# Patient Record
Sex: MALE | Race: WHITE | NOT HISPANIC OR LATINO | Employment: UNEMPLOYED | URBAN - METROPOLITAN AREA
[De-identification: names, ages, dates, MRNs, and addresses within clinical notes are randomized per-mention and may not be internally consistent; named-entity substitution may affect disease eponyms.]

---
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
## 2020-10-14 ENCOUNTER — TELEPHONE (OUTPATIENT)
Dept: FAMILY MEDICINE CLINIC | Facility: MEDICAL CENTER | Age: 20
End: 2020-10-14

## 2020-10-14 ENCOUNTER — NURSE TRIAGE (OUTPATIENT)
Dept: OTHER | Facility: OTHER | Age: 20
End: 2020-10-14

## 2020-11-16 ENCOUNTER — OFFICE VISIT (OUTPATIENT)
Dept: URGENT CARE | Facility: MEDICAL CENTER | Age: 20
End: 2020-11-16
Payer: COMMERCIAL

## 2020-11-16 VITALS
TEMPERATURE: 98.2 F | SYSTOLIC BLOOD PRESSURE: 140 MMHG | OXYGEN SATURATION: 99 % | HEART RATE: 60 BPM | RESPIRATION RATE: 20 BRPM | DIASTOLIC BLOOD PRESSURE: 75 MMHG

## 2020-11-16 DIAGNOSIS — J02.9 SORE THROAT: Primary | ICD-10-CM

## 2020-11-16 LAB — S PYO AG THROAT QL: NEGATIVE

## 2020-11-16 PROCEDURE — 87880 STREP A ASSAY W/OPTIC: CPT | Performed by: PHYSICIAN ASSISTANT

## 2020-11-16 PROCEDURE — 99213 OFFICE O/P EST LOW 20 MIN: CPT | Performed by: PHYSICIAN ASSISTANT

## 2020-11-16 PROCEDURE — 87070 CULTURE OTHR SPECIMN AEROBIC: CPT | Performed by: PHYSICIAN ASSISTANT

## 2020-11-16 RX ORDER — LORATADINE 10 MG/1
10 TABLET ORAL DAILY
Qty: 30 TABLET | Refills: 0 | Status: SHIPPED | OUTPATIENT
Start: 2020-11-16

## 2020-11-18 LAB — BACTERIA THROAT CULT: NORMAL

## 2020-12-06 ENCOUNTER — OFFICE VISIT (OUTPATIENT)
Dept: URGENT CARE | Facility: MEDICAL CENTER | Age: 20
End: 2020-12-06
Payer: COMMERCIAL

## 2020-12-06 VITALS
WEIGHT: 145 LBS | HEIGHT: 66 IN | BODY MASS INDEX: 23.3 KG/M2 | HEART RATE: 94 BPM | RESPIRATION RATE: 18 BRPM | OXYGEN SATURATION: 97 % | TEMPERATURE: 98.2 F

## 2020-12-06 DIAGNOSIS — J02.9 SORE THROAT: Primary | ICD-10-CM

## 2020-12-06 LAB — S PYO AG THROAT QL: NEGATIVE

## 2020-12-06 PROCEDURE — 99213 OFFICE O/P EST LOW 20 MIN: CPT | Performed by: PHYSICIAN ASSISTANT

## 2020-12-06 PROCEDURE — 87880 STREP A ASSAY W/OPTIC: CPT | Performed by: PHYSICIAN ASSISTANT

## 2020-12-06 PROCEDURE — 87070 CULTURE OTHR SPECIMN AEROBIC: CPT | Performed by: PHYSICIAN ASSISTANT

## 2020-12-08 LAB — BACTERIA THROAT CULT: NORMAL

## 2021-08-05 ENCOUNTER — OFFICE VISIT (OUTPATIENT)
Dept: URGENT CARE | Facility: MEDICAL CENTER | Age: 21
End: 2021-08-05
Payer: COMMERCIAL

## 2021-08-05 VITALS
WEIGHT: 185 LBS | HEIGHT: 66 IN | HEART RATE: 77 BPM | OXYGEN SATURATION: 97 % | TEMPERATURE: 98.8 F | BODY MASS INDEX: 29.73 KG/M2 | RESPIRATION RATE: 18 BRPM

## 2021-08-05 DIAGNOSIS — B34.9 ACUTE VIRAL SYNDROME: Primary | ICD-10-CM

## 2021-08-05 PROCEDURE — U0005 INFEC AGEN DETEC AMPLI PROBE: HCPCS | Performed by: PHYSICIAN ASSISTANT

## 2021-08-05 PROCEDURE — U0003 INFECTIOUS AGENT DETECTION BY NUCLEIC ACID (DNA OR RNA); SEVERE ACUTE RESPIRATORY SYNDROME CORONAVIRUS 2 (SARS-COV-2) (CORONAVIRUS DISEASE [COVID-19]), AMPLIFIED PROBE TECHNIQUE, MAKING USE OF HIGH THROUGHPUT TECHNOLOGIES AS DESCRIBED BY CMS-2020-01-R: HCPCS | Performed by: PHYSICIAN ASSISTANT

## 2021-08-05 PROCEDURE — 99213 OFFICE O/P EST LOW 20 MIN: CPT | Performed by: PHYSICIAN ASSISTANT

## 2021-08-05 NOTE — PROGRESS NOTES
[facility]        NAME: Betty Hyatt is a 23 y o  male  : 2000    MRN: [id number]  DATE: 2021  TIME: 11:39 AM    Assessment and Plan   Acute viral syndrome [B34 9]  1  Acute viral syndrome  Novel Coronavirus (Covid-19),PCR [facility] - Office Collection         Patient Instructions     1  Increase fluids  2  Tylenol as needed if febrile  3  Strict quarantine until test results available  4  Follow up with PCP in 3-5 days if symptoms persist       Chief Complaint     Chief Complaint   Patient presents with    Fever     Started on Monday with fever 103 8, has been taking tylenol and ibuprofen  FEver is broke and needs to return to work  History of Present Illness        Jadon Diaz is a 11-year-old male that presents with a 3 day history of acute onset fever and headache  Patient denies any chills, body aches, nasal symptoms or cough  He denies any alteration in his sense of smell /taste or nausea/ vomiting since the beginning of symptoms  Patient reports his fever height was up to 103 but improved with antipyretics  He is not vaccinated for COVID-19  Review of Systems   Review of Systems   Constitutional: Positive for fever  HENT: Negative  Respiratory: Negative  Gastrointestinal: Negative  Neurological: Positive for headaches  Current Medications       Current Outpatient Medications:     loratadine (CLARITIN) 10 mg tablet, Take 1 tablet (10 mg total) by mouth daily (Patient not taking: Reported on 2021), Disp: 30 tablet, Rfl: 0    Current Allergies     Allergies as of 2021    (No Known Allergies)            The following portions of the patient's history were reviewed and updated as appropriate: allergies, current medications, past family history, past medical history, past social history, past surgical history and problem list      History reviewed  No pertinent past medical history  History reviewed   No pertinent surgical history  Family History   Problem Relation Age of Onset    No Known Problems Mother     No Known Problems Father          Medications have been verified  Objective   Pulse 77   Temp 98 8 °F (37 1 °C) (Temporal)   Resp 18   Ht 5' 6" (1 676 m)   Wt 83 9 kg (185 lb)   SpO2 97%   BMI 29 86 kg/m²   No LMP for male patient  Physical Exam     Physical Exam  Constitutional:       General: He is not in acute distress  Appearance: Normal appearance  HENT:      Head: Normocephalic and atraumatic  Right Ear: Tympanic membrane and ear canal normal       Left Ear: Tympanic membrane and ear canal normal       Nose: Nose normal       Mouth/Throat:      Lips: Pink  Pharynx: Oropharynx is clear  Cardiovascular:      Rate and Rhythm: Normal rate and regular rhythm  Heart sounds: Normal heart sounds  No murmur heard  Pulmonary:      Effort: Pulmonary effort is normal       Breath sounds: Normal breath sounds  Neurological:      Mental Status: He is alert

## 2021-08-05 NOTE — LETTER
August 5, 2021     Patient: Jesús Gomes   YOB: 2000   Date of Visit: 8/5/2021       To Whom It May Concern: It is my medical opinion that Fortunato Hamilton may return to work on 8/7/21  If you have any questions or concerns, please don't hesitate to call           Sincerely,        Bridget Licona PA-C    CC: No Recipients

## 2021-08-06 LAB — SARS-COV-2 RNA RESP QL NAA+PROBE: NEGATIVE

## 2022-08-07 ENCOUNTER — APPOINTMENT (OUTPATIENT)
Dept: RADIOLOGY | Facility: MEDICAL CENTER | Age: 22
End: 2022-08-07
Attending: PHYSICIAN ASSISTANT
Payer: COMMERCIAL

## 2022-08-07 ENCOUNTER — OFFICE VISIT (OUTPATIENT)
Dept: URGENT CARE | Facility: MEDICAL CENTER | Age: 22
End: 2022-08-07
Payer: COMMERCIAL

## 2022-08-07 VITALS
OXYGEN SATURATION: 98 % | DIASTOLIC BLOOD PRESSURE: 74 MMHG | HEART RATE: 86 BPM | WEIGHT: 192.38 LBS | TEMPERATURE: 97.9 F | BODY MASS INDEX: 31.05 KG/M2 | RESPIRATION RATE: 14 BRPM | SYSTOLIC BLOOD PRESSURE: 120 MMHG

## 2022-08-07 DIAGNOSIS — M25.531 RIGHT WRIST PAIN: ICD-10-CM

## 2022-08-07 DIAGNOSIS — M25.531 RIGHT WRIST PAIN: Primary | ICD-10-CM

## 2022-08-07 PROCEDURE — 29125 APPL SHORT ARM SPLINT STATIC: CPT | Performed by: PHYSICIAN ASSISTANT

## 2022-08-07 PROCEDURE — 73110 X-RAY EXAM OF WRIST: CPT

## 2022-08-07 PROCEDURE — 99213 OFFICE O/P EST LOW 20 MIN: CPT | Performed by: PHYSICIAN ASSISTANT

## 2022-08-07 NOTE — LETTER
August 7, 2022     Patient: Devin Busby   YOB: 2000   Date of Visit: 8/7/2022       To Whom It May Concern: It is my medical opinion that Maxim Her may return to work on 8/9/22  If you have any questions or concerns, please don't hesitate to call  Sincerely,        Melly Barclay PA-C    CC: [employer]

## 2022-08-07 NOTE — PROGRESS NOTES
[facility]        NAME: Elmer Velez is a 24 y o  male  : 2000    MRN: [id number]  DATE: 2022  TIME: 7:19 PM    Assessment and Plan   Right wrist pain [M25 531]  1  Right wrist pain  XR wrist 3+ vw right    Splint application         Patient Instructions     1  Apply ICE to the area 10-15min 3-4x daily  2  Motrin as needed for pain/swelling  3  Continue in wrist splint until pain free  4  Recommend consult with Orthopedics if symptoms persist        Chief Complaint     Chief Complaint   Patient presents with    Wrist Pain     Right wrist pain injured today sliding into 2nd base and feels like wrist is jammed  Limited ROM, very stiff  Mild swelling noted  History of Present Illness       Jonathan Camacho is a 58-year-old male who presents with pain and swelling of his right wrist after an accident that occurred earlier today  Patient was playing softball and while attempting to slight into 2nd base had his right wrist hyperextended  He denies hearing or feeling a snap pop but had immediate pain and swelling to the region  Patient currently rates his pain as 4 on a scale of 1-10, he denies any prior wrist or hand injuries  Wrist Pain   Pertinent negatives include no numbness  Review of Systems   Review of Systems   Constitutional: Negative  Musculoskeletal: Positive for joint swelling  Neurological: Negative for weakness and numbness           Current Medications       Current Outpatient Medications:     loratadine (CLARITIN) 10 mg tablet, Take 1 tablet (10 mg total) by mouth daily (Patient not taking: No sig reported), Disp: 30 tablet, Rfl: 0    Current Allergies     Allergies as of 2022    (No Known Allergies)            The following portions of the patient's history were reviewed and updated as appropriate: allergies, current medications, past family history, past medical history, past social history, past surgical history and problem list      History reviewed  No pertinent past medical history  No past surgical history on file  Family History   Problem Relation Age of Onset    No Known Problems Mother     No Known Problems Father          Medications have been verified  Objective   /74   Pulse 86   Temp 97 9 °F (36 6 °C)   Resp 14   Wt 87 3 kg (192 lb 6 oz)   SpO2 98%   BMI 31 05 kg/m²   No LMP for male patient  Physical Exam     Physical Exam  Constitutional:       General: He is not in acute distress  Appearance: Normal appearance  He is not ill-appearing  Cardiovascular:      Rate and Rhythm: Normal rate and regular rhythm  Heart sounds: Normal heart sounds  No murmur heard  Pulmonary:      Effort: Pulmonary effort is normal       Breath sounds: Normal breath sounds  Musculoskeletal:      Right wrist: Swelling, tenderness, bony tenderness and snuff box tenderness present  No deformity  Decreased range of motion  Comments: Tenderness to palpation over the right distal radius and scaphoid region  Pain level increased with resisted forearm pronation/supination  Neurological:      Mental Status: He is alert  Discuss x-ray with patient, no visible evidence of fracture dislocation  Final results to be reviewed by radiology        Splint application    Date/Time: 8/7/2022 6:59 PM  Performed by: Alejandro Rico PA-C  Authorized by: Alejandro Rico PA-C   Universal Protocol:  Consent given by: patient  Patient understanding: patient states understanding of the procedure being performed  Patient consent: the patient's understanding of the procedure matches consent given      Procedure details:     Location:  Wrist    Splint type:  Short arm splint, static (forearm to hand)

## 2022-08-07 NOTE — PATIENT INSTRUCTIONS
1  Apply ICE to the area 10-15min 3-4x daily  2  Motrin as needed for pain/swelling  3  Continue in wrist splint until pain free  4   Recommend consult with Orthopedics if symptoms persist

## 2022-08-11 ENCOUNTER — TELEPHONE (OUTPATIENT)
Dept: FAMILY MEDICINE CLINIC | Facility: MEDICAL CENTER | Age: 22
End: 2022-08-11

## 2022-08-11 NOTE — TELEPHONE ENCOUNTER
[facility] called to request referral for pt's appt on 8/13/22   [id number], dx M25 [id number], Office, NPI [id number], location [address] will retrieve from pt's chart

## 2022-08-13 ENCOUNTER — HOSPITAL ENCOUNTER (OUTPATIENT)
Dept: RADIOLOGY | Facility: HOSPITAL | Age: 22
Discharge: HOME | End: 2022-08-13
Payer: COMMERCIAL

## 2022-08-13 VITALS — OXYGEN SATURATION: 98 % | BODY MASS INDEX: 31.21 KG/M2 | WEIGHT: 194.2 LBS | HEART RATE: 78 BPM | HEIGHT: 66 IN

## 2022-08-13 DIAGNOSIS — M25.531 PAIN IN RIGHT WRIST: ICD-10-CM

## 2022-08-13 DIAGNOSIS — M25.531 PAIN IN RIGHT WRIST: Primary | ICD-10-CM

## 2022-08-13 DIAGNOSIS — S63.501A RIGHT WRIST SPRAIN, INITIAL ENCOUNTER: ICD-10-CM

## 2022-08-13 PROCEDURE — 73110 X-RAY EXAM OF WRIST: CPT

## 2022-08-13 PROCEDURE — 99203 OFFICE O/P NEW LOW 30 MIN: CPT | Performed by: PHYSICIAN ASSISTANT

## 2022-08-13 NOTE — PROGRESS NOTES
Orthopaedic Surgery - Office Note  June Gray (86 y o  male)   : 2000   MRN: [id number]  Encounter Date: 2022    Chief Complaint   Patient presents with    Right Wrist - Pain         Assessment/Plan  Diagnoses and all orders for this visit:    Pain in right wrist  -     Ambulatory Referral to Occupational Therapy; Future  -     XR wrist 3+ vw right; Future    Right wrist sprain, initial encounter  -     Ambulatory Referral to Occupational Therapy; Future  -     XR wrist 3+ vw right; Future    Patient was advised his x-rays are negative for scaphoid fracture  He will be treated for wrist sprain of the extensor tendons and be started occupational therapy  The brace will be for comfort only and he may progress out of it over the next several weeks  He will ice the wrist for comfort 20 minutes on 1 hour off 3 times a day  No surgery is recommended for this condition  Return in 4 weeks with sports medicine  History of Present Illness  This is a new patient who injured his right wrist on 2022 while sliding into 2nd base  He describes a hyper extension injury to the wrist   This occurred while playing softball  He was [facility] at the [facility] and placed in a splint  No paresthesias are reported  He had x-rays taken which were available for review and are negative for x-ray  He is right-hand dominant and has not had problems with this wrist in the past     Review of Systems  Pertinent items are noted in HPI  All other systems were reviewed and are negative  Physical Exam  Pulse 78   Ht 5' 6" (1 676 m)   Wt 88 1 kg (194 lb 3 2 oz)   SpO2 98%   BMI 31 34 kg/m²   Cons: Appears well  No apparent distress  Psych: Alert  Oriented x3  Mood and affect normal   Eyes: PERRLA, EOMI  Resp: Normal effort  No audible wheezing or stridor  CV: Palpable pulse  No discernable arrhythmia  Lymph:  No palpable cervical, axillary, or inguinal lymphadenopathy  Skin: Warm    No palpable masses  No visible lesions  Neuro: Normal muscle tone  Normal and symmetric DTR's  Patient's right wrist is nontender to palpation at the distal radius and ulna  There is no skin breakdown lesions or signs of infection  He has no crepitus or pain at the TFCC  He has mild anatomical snuffbox tenderness and moderate tenderness in the 1st dorsal compartment  He has pain against wrist extension  Strength is decreased to 4/5  He has full active and passive range of motion at the right wrist to extension flexion, ulnar deviation, radial deviation, supination, and pronation  He has pain to end range of motion to wrist extension  He is neurovascularly intact with normal radial and ulnar pulses  He has full range of motion at all MCP and IP joints of the digits in the right hand  X-rays performed in the office today three views of the right wrist show no evidence of a scaphoid fracture  These read from an orthopedic standpoint will await official radiologist interpretation  Studies Reviewed  Study Result    Narrative & Impression   RIGHT WRIST     INDICATION:   M25 531: Pain in right wrist      COMPARISON:  None     VIEWS:  XR WRIST 3+ VW RIGHT         FINDINGS:     There is no acute fracture or dislocation      No significant degenerative changes      No lytic or blastic osseous lesion      Soft tissues are unremarkable      IMPRESSION:     No acute osseous abnormality            Workstation performed: [id number]     X-ray images as well as reports were reviewed by myself and agree with radiologist interpretation  Office notes from 08/07/2022 were reviewed by myself in the office today    Procedures  No procedures today  Medical, Surgical, Family, and Social History  The patient's medical history, family history, and social history, were reviewed and updated as appropriate  History reviewed  No pertinent past medical history  History reviewed   No pertinent surgical history      Family History   Problem Relation Age of Onset    No Known Problems Mother     No Known Problems Father        Social History     Occupational History    Not on file   Tobacco Use    Smoking status: Never Smoker    Smokeless tobacco: Never Used   Vaping Use    Vaping Use: Never used   Substance and Sexual Activity    Alcohol use: Not Currently    Drug use: Yes     Types: Marijuana     Comment: medical card    Sexual activity: Never       No Known Allergies      Current Outpatient Medications:     loratadine (CLARITIN) 10 mg tablet, Take 1 tablet (10 mg total) by mouth daily (Patient not taking: No sig reported), Disp: 30 tablet, Rfl: 0      Omar Castillo PA-C

## 2022-08-13 NOTE — PATIENT INSTRUCTIONS
Wrist Sprain   AMBULATORY CARE:   A wrist sprain  happens when one or more ligaments in your wrist stretch or tear  Ligaments are tough tissues that connect bones and keep them in place, and support your joints  Common symptoms include the following:   Bruising or changes in skin color    Pain and stiffness    Popping sound in your wrist when you move it    Swelling and tenderness    Seek care immediately if:   You have severe pain or swelling  Your injured wrist is red or has red streaks spreading from the injured area  You have new trouble moving your hands, fingers, or wrist     Your wrist, hand, or fingers feel cold or numb  Your fingernails turn blue or gray  Call your doctor if:   Your symptoms get worse  Your sprain does not get better within 2 weeks  You have questions or concerns about your condition or care  Treatment for a wrist sprain  depends on how severe your sprain is  You may need any of the following:  NSAIDs , such as ibuprofen, help decrease swelling, pain, and fever  NSAIDs can cause stomach bleeding or kidney problems in certain people  If you take blood thinner medicine, always ask your healthcare provider if NSAIDs are safe for you  Always read the medicine label and follow directions  Acetaminophen  decreases pain and fever  It is available without a doctor's order  Ask how much to take and how often to take it  Follow directions  Read the labels of all other medicines you are using to see if they also contain acetaminophen, or ask your doctor or pharmacist  Acetaminophen can cause liver damage if not taken correctly  Do not use more than 4 grams (4,000 milligrams) total of acetaminophen in one day  A splint or cast  helps support your wrist and prevent more damage  Wear your splint as directed  Ask for instructions on how to bathe while wearing a splint or cast     Surgery  may be needed if you have a severe sprain   Arthroscopy may be done to examine the inside of your wrist joint and repair ligament damage  Arthroscopy uses a scope that is inserted through a small incision  You may need open surgery to reconnect torn ligaments to the bone  Physical therapy  may be recommended  A physical therapist teaches you exercises to help improve movement and strength, and to decrease pain  Care for a wrist sprain:   Rest  your wrist for at least 48 hours  Avoid activities that cause pain  Ice  your wrist for 15 to 20 minutes every hour or as directed  Use an ice pack, or put crushed ice in a plastic bag  Cover it with a towel before you put it on your wrist  Ice helps prevent tissue damage and decreases swelling and pain  Compress  your wrist with an elastic bandage  This will help decrease swelling, support your wrist, and help it heal  Wear your wrist wrap as directed  The elastic bandage should be snug but not tight  Elevate  your wrist above the level of your heart as often as you can  This will help decrease swelling and pain  Prop your wrist on pillows or blankets to keep it elevated comfortably  Follow up with your doctor as directed:  Write down your questions so you remember to ask them during your visits  © Copyright [employer] 2022 Information is for End User's use only and may not be sold, redistributed or otherwise used for commercial purposes  All illustrations and images included in CareNotes® are the copyrighted property of [employer]  or [employer]  The above information is an  only  It is not intended as medical advice for individual conditions or treatments  Talk to your doctor, nurse or pharmacist before following any medical regimen to see if it is safe and effective for you

## 2024-03-29 ENCOUNTER — OFFICE VISIT (OUTPATIENT)
Dept: URGENT CARE | Facility: MEDICAL CENTER | Age: 24
End: 2024-03-29
Payer: COMMERCIAL

## 2024-03-29 VITALS
BODY MASS INDEX: 35.68 KG/M2 | OXYGEN SATURATION: 97 % | RESPIRATION RATE: 18 BRPM | HEIGHT: 66 IN | TEMPERATURE: 98 F | SYSTOLIC BLOOD PRESSURE: 136 MMHG | DIASTOLIC BLOOD PRESSURE: 76 MMHG | WEIGHT: 222 LBS | HEART RATE: 72 BPM

## 2024-03-29 DIAGNOSIS — J20.9 ACUTE BRONCHITIS, UNSPECIFIED ORGANISM: Primary | ICD-10-CM

## 2024-03-29 PROCEDURE — 99213 OFFICE O/P EST LOW 20 MIN: CPT

## 2024-03-29 RX ORDER — PREDNISONE 20 MG/1
20 TABLET ORAL DAILY
Qty: 5 TABLET | Refills: 0 | Status: SHIPPED | OUTPATIENT
Start: 2024-03-29 | End: 2024-04-03

## 2024-03-29 RX ORDER — BENZONATATE 200 MG/1
200 CAPSULE ORAL 3 TIMES DAILY PRN
Qty: 20 CAPSULE | Refills: 0 | Status: SHIPPED | OUTPATIENT
Start: 2024-03-29

## 2024-03-29 NOTE — PROGRESS NOTES
[facility]        NAME: Alonzo Laureano is a 23 y.o. male  : 2000    MRN: [id number]  DATE: 2024  TIME: 11:45 AM    Assessment and Plan   Acute bronchitis, unspecified organism [J20.9]  1. Acute bronchitis, unspecified organism  benzonatate (TESSALON) 200 MG capsule    predniSONE 20 mg tablet            Patient Instructions     Please take Prednisone daily with breakfast for 5 days.   Please trial Tessalon every 8 hours as needed for cough.   Continue with OTC cough and cold medication.   Drink plenty of fluids.   Follow up with PCP in 3-5 days.  Proceed to  ER if symptoms worsen.    If tests are performed, our office will contact you with results only if changes need to made to the care plan discussed with you at the visit. You can review your full results on [facility].    Chief Complaint     Chief Complaint   Patient presents with    Cold Like Symptoms     Started 5 days ago with cough, wheezing, congestion and slight sore throat.  Taking dayquil.         History of Present Illness       23-year-old male presenting for evaluation of upper respiratory symptoms.  Patient states over the past 5 days he has been experiencing cough, congestion and sore throat.  He states his cough is nonproductive and associated with chest tightness, shortness of breath and wheezing.  He has been taking DayQuil with mild relief of his symptoms.  He denies any fevers, chills, chest pain, nausea, vomiting or diarrhea.        Review of Systems   Review of Systems   Constitutional:  Negative for chills and fever.   HENT:  Positive for congestion and sore throat.    Respiratory:  Positive for cough, chest tightness, shortness of breath and wheezing.    Cardiovascular:  Negative for chest pain.   Gastrointestinal:  Negative for diarrhea, nausea and vomiting.   All other systems reviewed and are negative.        Current Medications       Current Outpatient Medications:     benzonatate (TESSALON) 200  "MG capsule, Take 1 capsule (200 mg total) by mouth 3 (three) times a day as needed for cough, Disp: 20 capsule, Rfl: 0    predniSONE 20 mg tablet, Take 1 tablet (20 mg total) by mouth daily for 5 days, Disp: 5 tablet, Rfl: 0    loratadine (CLARITIN) 10 mg tablet, Take 1 tablet (10 mg total) by mouth daily (Patient not taking: Reported on 8/5/2021), Disp: 30 tablet, Rfl: 0    Current Allergies     Allergies as of 03/29/2024    (No Known Allergies)            The following portions of the patient's history were reviewed and updated as appropriate: allergies, current medications, past family history, past medical history, past social history, past surgical history and problem list.     History reviewed. No pertinent past medical history.    History reviewed. No pertinent surgical history.    Family History   Problem Relation Age of Onset    No Known Problems Mother     No Known Problems Father          Medications have been verified.        Objective   /76   Pulse 72   Temp 98 °F (36.7 °C) (Temporal)   Resp 18   Ht 5' 6\" (1.676 m)   Wt 101 kg (222 lb)   SpO2 97%   BMI 35.83 kg/m²        Physical Exam     Physical Exam  Vitals and nursing note reviewed.   Constitutional:       General: He is not in acute distress.     Appearance: Normal appearance. He is normal weight. He is not ill-appearing, toxic-appearing or diaphoretic.   HENT:      Head: Normocephalic and atraumatic.      Nose: Congestion present. No rhinorrhea.      Mouth/Throat:      Mouth: Mucous membranes are moist.      Pharynx: Oropharynx is clear. Posterior oropharyngeal erythema present. No oropharyngeal exudate.   Eyes:      General:         Right eye: No discharge.         Left eye: No discharge.   Cardiovascular:      Rate and Rhythm: Normal rate and regular rhythm.      Pulses: Normal pulses.      Heart sounds: Normal heart sounds. No murmur heard.     No friction rub. No gallop.   Pulmonary:      Effort: Pulmonary effort is normal. No " respiratory distress.      Breath sounds: No stridor. Wheezing (scattered upper lobes) present. No rhonchi or rales.   Chest:      Chest wall: No tenderness.   Abdominal:      Palpations: Abdomen is soft.      Tenderness: There is no abdominal tenderness.   Skin:     General: Skin is warm and dry.   Neurological:      Mental Status: He is alert.   Psychiatric:         Mood and Affect: Mood normal.         Behavior: Behavior normal.

## 2024-03-29 NOTE — PATIENT INSTRUCTIONS
Please take Prednisone daily with breakfast for 5 days.   Please trial Tessalon every 8 hours as needed for cough.   Continue with OTC cough and cold medication.   Drink plenty of fluids.

## 2024-05-20 ENCOUNTER — OFFICE VISIT (OUTPATIENT)
Dept: URGENT CARE | Facility: MEDICAL CENTER | Age: 24
End: 2024-05-20
Payer: COMMERCIAL

## 2024-05-20 VITALS
BODY MASS INDEX: 35.71 KG/M2 | SYSTOLIC BLOOD PRESSURE: 130 MMHG | RESPIRATION RATE: 16 BRPM | WEIGHT: 221.25 LBS | DIASTOLIC BLOOD PRESSURE: 82 MMHG | OXYGEN SATURATION: 97 % | HEART RATE: 87 BPM | TEMPERATURE: 97.8 F

## 2024-05-20 DIAGNOSIS — L03.031 PARONYCHIA OF GREAT TOE OF RIGHT FOOT: Primary | ICD-10-CM

## 2024-05-20 DIAGNOSIS — L60.0 INGROWN RIGHT GREATER TOENAIL: ICD-10-CM

## 2024-05-20 PROCEDURE — S9083 URGENT CARE CENTER GLOBAL: HCPCS

## 2024-05-20 PROCEDURE — G0383 LEV 4 HOSP TYPE B ED VISIT: HCPCS

## 2024-05-20 RX ORDER — CEPHALEXIN 500 MG/1
500 CAPSULE ORAL EVERY 12 HOURS SCHEDULED
Qty: 10 CAPSULE | Refills: 0 | Status: SHIPPED | OUTPATIENT
Start: 2024-05-20 | End: 2024-05-25

## 2024-05-20 NOTE — PROGRESS NOTES
[facility]        NAME: Alonzo Laureano is a 23 y.o. male  : 2000    MRN: [id number]  DATE: May 20, 2024  TIME: 8:53 AM    Assessment and Plan   Paronychia of great toe of right foot [L03.031]  1. Paronychia of great toe of right foot  cephalexin (KEFLEX) 500 mg capsule      2. Ingrown right greater toenail  Ambulatory Referral to Podiatry            Patient Instructions     Please take Keflex 2 times daily for the next 5 days.  Trial warm Epsom salt soaks.  Referral to podiatry for further workup and evaluation of current symptoms.  Follow up with PCP in 3-5 days.  Proceed to  ER if symptoms worsen.    If tests are performed, our office will contact you with results only if changes need to made to the care plan discussed with you at the visit. [facility] on [facility].    Chief Complaint     Chief Complaint   Patient presents with    Foot Pain     Right great toe pain. Pt states he attempted to take out an ingrown toenail 1 month ago. Some redness. Painful to touch. No fever or chills. Using epsom salt with water and hydrogen peroxide to cleanse.          History of Present Illness       23-year-old male presenting for evaluation of right great toe pain.  Patient states that 1 month ago he attempted to remove an ingrown toenail, and since then has been having experiencing redness, swelling and pain.  He denies any drainage or bleeding.  Patient notes over the past 2 weeks the pain has increased.  He denies any fevers or chills, numbness or tingling.  He denies any alleviating factors of his symptoms.        Review of Systems   Review of Systems   Constitutional:  Negative for chills and fever.   Musculoskeletal:  Positive for arthralgias and joint swelling.   Skin:  Positive for color change.   Neurological:  Negative for weakness and numbness.   All other systems reviewed and are negative.        Current Medications       Current Outpatient Medications:      cephalexin (KEFLEX) 500 mg capsule, Take 1 capsule (500 mg total) by mouth every 12 (twelve) hours for 5 days, Disp: 10 capsule, Rfl: 0    benzonatate (TESSALON) 200 MG capsule, Take 1 capsule (200 mg total) by mouth 3 (three) times a day as needed for cough (Patient not taking: Reported on 5/20/2024), Disp: 20 capsule, Rfl: 0    loratadine (CLARITIN) 10 mg tablet, Take 1 tablet (10 mg total) by mouth daily (Patient not taking: Reported on 8/5/2021), Disp: 30 tablet, Rfl: 0    Current Allergies     Allergies as of 05/20/2024    (No Known Allergies)            The following portions of the patient's history were reviewed and updated as appropriate: allergies, current medications, past family history, past medical history, past social history, past surgical history and problem list.     History reviewed. No pertinent past medical history.    History reviewed. No pertinent surgical history.    Family History   Problem Relation Age of Onset    No Known Problems Mother     No Known Problems Father          Medications have been verified.        Objective   /82   Pulse 87   Temp 97.8 °F (36.6 °C)   Resp 16   Wt 100 kg (221 lb 4 oz)   SpO2 97%   BMI 35.71 kg/m²        Physical Exam     Physical Exam  Vitals and nursing note reviewed.   Constitutional:       General: He is not in acute distress.     Appearance: Normal appearance. He is not ill-appearing, toxic-appearing or diaphoretic.   HENT:      Head: Normocephalic and atraumatic.   Eyes:      General:         Right eye: No discharge.         Left eye: No discharge.   Cardiovascular:      Rate and Rhythm: Normal rate.      Pulses: Normal pulses.   Pulmonary:      Effort: Pulmonary effort is normal.   Musculoskeletal:         General: Swelling and tenderness present. No signs of injury.        Feet:    Feet:      Right foot:      Skin integrity: Erythema present.      Toenail Condition: Right toenails are ingrown.   Skin:     General: Skin is warm and dry.       Capillary Refill: Capillary refill takes less than 2 seconds.      Findings: Erythema present. No bruising.   Neurological:      Mental Status: He is alert.   Psychiatric:         Mood and Affect: Mood normal.         Behavior: Behavior normal.

## 2024-05-20 NOTE — PATIENT INSTRUCTIONS
Please take Keflex 2 times daily for the next 5 days.  Trial warm Epsom salt soaks.  Referral to podiatry for further workup and evaluation of current symptoms.  Follow up with PCP in 3-5 days.  Proceed to  ER if symptoms worsen.    If tests are performed, our office will contact you with results only if changes need to made to the care plan discussed with you at the visit. You can review your full results on [facility].